# Patient Record
Sex: FEMALE | Race: WHITE | URBAN - METROPOLITAN AREA
[De-identification: names, ages, dates, MRNs, and addresses within clinical notes are randomized per-mention and may not be internally consistent; named-entity substitution may affect disease eponyms.]

---

## 2019-05-29 ENCOUNTER — APPOINTMENT (RX ONLY)
Dept: URBAN - METROPOLITAN AREA CLINIC 61 | Facility: CLINIC | Age: 79
Setting detail: DERMATOLOGY
End: 2019-05-29

## 2019-05-29 DIAGNOSIS — H00.01 HORDEOLUM EXTERNUM: ICD-10-CM

## 2019-05-29 DIAGNOSIS — L30.4 ERYTHEMA INTERTRIGO: ICD-10-CM

## 2019-05-29 PROBLEM — I10 ESSENTIAL (PRIMARY) HYPERTENSION: Status: ACTIVE | Noted: 2019-05-29

## 2019-05-29 PROBLEM — E03.9 HYPOTHYROIDISM, UNSPECIFIED: Status: ACTIVE | Noted: 2019-05-29

## 2019-05-29 PROBLEM — H00.011 HORDEOLUM EXTERNUM RIGHT UPPER EYELID: Status: ACTIVE | Noted: 2019-05-29

## 2019-05-29 PROBLEM — M12.9 ARTHROPATHY, UNSPECIFIED: Status: ACTIVE | Noted: 2019-05-29

## 2019-05-29 PROBLEM — E13.9 OTHER SPECIFIED DIABETES MELLITUS WITHOUT COMPLICATIONS: Status: ACTIVE | Noted: 2019-05-29

## 2019-05-29 PROBLEM — K21.9 GASTRO-ESOPHAGEAL REFLUX DISEASE WITHOUT ESOPHAGITIS: Status: ACTIVE | Noted: 2019-05-29

## 2019-05-29 PROBLEM — E78.5 HYPERLIPIDEMIA, UNSPECIFIED: Status: ACTIVE | Noted: 2019-05-29

## 2019-05-29 PROCEDURE — ? PRESCRIPTION

## 2019-05-29 PROCEDURE — ? TREATMENT REGIMEN

## 2019-05-29 PROCEDURE — 99202 OFFICE O/P NEW SF 15 MIN: CPT

## 2019-05-29 PROCEDURE — ? COUNSELING

## 2019-05-29 RX ORDER — MUPIROCIN 20 MG/G
OINTMENT TOPICAL
Qty: 1 | Refills: 3 | Status: ERX | COMMUNITY
Start: 2019-05-29

## 2019-05-29 RX ADMIN — MUPIROCIN: 20 OINTMENT TOPICAL at 14:10

## 2019-05-29 ASSESSMENT — LOCATION DETAILED DESCRIPTION DERM
LOCATION DETAILED: SUBXIPHOID
LOCATION DETAILED: RIGHT LATERAL SUPERIOR EYELID

## 2019-05-29 ASSESSMENT — LOCATION ZONE DERM
LOCATION ZONE: EYELID
LOCATION ZONE: TRUNK

## 2019-05-29 ASSESSMENT — PAIN INTENSITY VAS: HOW INTENSE IS YOUR PAIN 0 BEING NO PAIN, 10 BEING THE MOST SEVERE PAIN POSSIBLE?: 2/10 PAIN

## 2019-05-29 ASSESSMENT — LOCATION SIMPLE DESCRIPTION DERM
LOCATION SIMPLE: ABDOMEN
LOCATION SIMPLE: RIGHT SUPERIOR EYELID

## 2019-05-29 NOTE — PROCEDURE: TREATMENT REGIMEN
Detail Level: Generalized
Otc Regimen: Zeasorb AF powder AM and hydrocortisone QD\\n[place] absorbent pads
Plan: Pt to go see ophthalmologist

## 2019-06-27 ENCOUNTER — APPOINTMENT (RX ONLY)
Dept: URBAN - METROPOLITAN AREA CLINIC 61 | Facility: CLINIC | Age: 79
Setting detail: DERMATOLOGY
End: 2019-06-27

## 2019-06-27 DIAGNOSIS — L30.4 ERYTHEMA INTERTRIGO: ICD-10-CM | Status: IMPROVED

## 2019-06-27 PROCEDURE — ? TREATMENT REGIMEN

## 2019-06-27 PROCEDURE — ? PRESCRIPTION

## 2019-06-27 PROCEDURE — ? COUNSELING

## 2019-06-27 PROCEDURE — 99213 OFFICE O/P EST LOW 20 MIN: CPT

## 2019-06-27 RX ORDER — MUPIROCIN 20 MG/G
OINTMENT TOPICAL
Qty: 2 | Refills: 3 | Status: ERX

## 2019-06-27 ASSESSMENT — SEVERITY ASSESSMENT: SEVERITY: MILD

## 2019-06-27 ASSESSMENT — LOCATION SIMPLE DESCRIPTION DERM: LOCATION SIMPLE: ABDOMEN

## 2019-06-27 ASSESSMENT — LOCATION ZONE DERM: LOCATION ZONE: TRUNK

## 2019-06-27 ASSESSMENT — LOCATION DETAILED DESCRIPTION DERM: LOCATION DETAILED: SUBXIPHOID

## 2019-06-27 ASSESSMENT — PAIN INTENSITY VAS: HOW INTENSE IS YOUR PAIN 0 BEING NO PAIN, 10 BEING THE MOST SEVERE PAIN POSSIBLE?: 1/10 PAIN

## 2019-06-27 NOTE — PROCEDURE: TREATMENT REGIMEN
Continue Regimen: Doxy course from Ophthalmology
Detail Level: Generalized
Otc Regimen: Zeasorb AF powder AM and hydrocortisone QD\\nHibiclens wash